# Patient Record
Sex: MALE | Race: WHITE | NOT HISPANIC OR LATINO | Employment: STUDENT | URBAN - METROPOLITAN AREA
[De-identification: names, ages, dates, MRNs, and addresses within clinical notes are randomized per-mention and may not be internally consistent; named-entity substitution may affect disease eponyms.]

---

## 2017-10-10 ENCOUNTER — OFFICE VISIT (OUTPATIENT)
Dept: PSYCHIATRY | Facility: CLINIC | Age: 22
End: 2017-10-10
Payer: COMMERCIAL

## 2017-10-10 VITALS
BODY MASS INDEX: 23.1 KG/M2 | SYSTOLIC BLOOD PRESSURE: 118 MMHG | DIASTOLIC BLOOD PRESSURE: 75 MMHG | HEART RATE: 69 BPM | WEIGHT: 147.19 LBS | HEIGHT: 67 IN

## 2017-10-10 DIAGNOSIS — F31.81 BIPOLAR II DISORDER: Primary | ICD-10-CM

## 2017-10-10 PROCEDURE — 90792 PSYCH DIAG EVAL W/MED SRVCS: CPT | Mod: S$GLB,,, | Performed by: PSYCHIATRY & NEUROLOGY

## 2017-10-10 PROCEDURE — 99999 PR PBB SHADOW E&M-EST. PATIENT-LVL II: CPT | Mod: PBBFAC,,, | Performed by: PSYCHIATRY & NEUROLOGY
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
## 2017-10-14 PROBLEM — F31.81 BIPOLAR II DISORDER: Status: ACTIVE | Noted: 2017-10-14

## 2017-10-19 ENCOUNTER — OFFICE VISIT (OUTPATIENT)
Dept: PSYCHIATRY | Facility: CLINIC | Age: 22
End: 2017-10-19
Payer: COMMERCIAL

## 2017-10-19 DIAGNOSIS — F31.81 BIPOLAR II DISORDER: Primary | ICD-10-CM

## 2017-10-19 PROCEDURE — 99999 PR PBB SHADOW E&M-EST. PATIENT-LVL II: CPT | Mod: PBBFAC,,, | Performed by: PSYCHIATRY & NEUROLOGY

## 2017-10-19 PROCEDURE — 99215 OFFICE O/P EST HI 40 MIN: CPT | Mod: S$GLB,,, | Performed by: PSYCHIATRY & NEUROLOGY

## 2017-10-19 RX ORDER — LITHIUM CARBONATE 300 MG
300 TABLET ORAL 2 TIMES DAILY
Qty: 60 TABLET | Refills: 2 | Status: SHIPPED | OUTPATIENT
Start: 2017-10-19 | End: 2018-01-28 | Stop reason: SDUPTHER

## 2017-10-19 NOTE — PATIENT INSTRUCTIONS
1. Start lithium 300 mg twice a day.  2. Return for follow up on Wednesday 10/25/17 at 9am.   3. Do not take your morning dose on Wednesday until after you get your blood drawn.  4. Get bloodwork on Wednesday 10/25 at 8:30 prior to your appointment (in lab area on 2nd floor of [facility])
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
## 2017-10-25 ENCOUNTER — OFFICE VISIT (OUTPATIENT)
Dept: PSYCHIATRY | Facility: CLINIC | Age: 22
End: 2017-10-25
Payer: COMMERCIAL

## 2017-10-25 VITALS
BODY MASS INDEX: 23.45 KG/M2 | SYSTOLIC BLOOD PRESSURE: 106 MMHG | WEIGHT: 149.38 LBS | HEIGHT: 67 IN | DIASTOLIC BLOOD PRESSURE: 65 MMHG | HEART RATE: 72 BPM

## 2017-10-25 DIAGNOSIS — F31.81 BIPOLAR II DISORDER: Primary | ICD-10-CM

## 2017-10-25 PROCEDURE — 99999 PR PBB SHADOW E&M-EST. PATIENT-LVL II: CPT | Mod: PBBFAC,,, | Performed by: PSYCHIATRY & NEUROLOGY

## 2017-10-25 PROCEDURE — 99215 OFFICE O/P EST HI 40 MIN: CPT | Mod: S$GLB,,, | Performed by: PSYCHIATRY & NEUROLOGY

## 2017-10-25 NOTE — PATIENT INSTRUCTIONS
1. Take 300 mg at bedtime tonight.  2. Do not take any medication tomorrow morning.  3. Get bloodwork tomorrow morning at 8 or 9am.  4. Starting tomorrow night, take 600 mg or 2 capsules every night at bedtime and nothing in the morning.
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
## 2017-11-06 ENCOUNTER — OFFICE VISIT (OUTPATIENT)
Dept: PSYCHIATRY | Facility: CLINIC | Age: 22
End: 2017-11-06
Payer: COMMERCIAL

## 2017-11-06 VITALS
WEIGHT: 149.19 LBS | SYSTOLIC BLOOD PRESSURE: 105 MMHG | DIASTOLIC BLOOD PRESSURE: 58 MMHG | HEIGHT: 67 IN | BODY MASS INDEX: 23.42 KG/M2 | HEART RATE: 80 BPM

## 2017-11-06 DIAGNOSIS — F31.81 BIPOLAR II DISORDER: Primary | ICD-10-CM

## 2017-11-06 PROCEDURE — 99999 PR PBB SHADOW E&M-EST. PATIENT-LVL III: CPT | Mod: PBBFAC,,, | Performed by: PSYCHIATRY & NEUROLOGY

## 2017-11-06 PROCEDURE — 99215 OFFICE O/P EST HI 40 MIN: CPT | Mod: S$GLB,,, | Performed by: PSYCHIATRY & NEUROLOGY
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
## 2017-11-06 NOTE — PATIENT INSTRUCTIONS
1. Continue lithium 600 mg at bedtime.  2. Get bloodwork today, set up with [facility] in front office.   3. Go to elv[facility] Koch.  4. If feeling worse, call or message through patient portal.  5. Set up patient portal.   6. Return on Monday 11/20 at 8am.

## 2018-01-28 DIAGNOSIS — F31.81 BIPOLAR II DISORDER: ICD-10-CM

## 2018-01-29 RX ORDER — LITHIUM CARBONATE 300 MG
TABLET ORAL
Qty: 60 TABLET | Refills: 0 | Status: SHIPPED | OUTPATIENT
Start: 2018-01-29 | End: 2018-10-06

## 2018-02-01 DIAGNOSIS — F31.81 BIPOLAR II DISORDER: ICD-10-CM

## 2018-02-01 RX ORDER — LITHIUM CARBONATE 300 MG
TABLET ORAL
Qty: 60 TABLET | Refills: 0 | OUTPATIENT
Start: 2018-02-01

## 2018-02-19 DIAGNOSIS — F31.81 BIPOLAR II DISORDER: ICD-10-CM

## 2018-02-20 RX ORDER — LITHIUM CARBONATE 300 MG
TABLET ORAL
Qty: 60 TABLET | Refills: 0 | OUTPATIENT
Start: 2018-02-20

## 2018-03-26 DIAGNOSIS — F31.81 BIPOLAR II DISORDER: ICD-10-CM

## 2018-03-27 RX ORDER — LITHIUM CARBONATE 300 MG
TABLET ORAL
Qty: 60 TABLET | Refills: 0 | OUTPATIENT
Start: 2018-03-27

## 2018-09-14 DIAGNOSIS — F31.81 BIPOLAR II DISORDER: ICD-10-CM

## 2018-09-16 DIAGNOSIS — F31.81 BIPOLAR II DISORDER: ICD-10-CM

## 2018-09-16 RX ORDER — LITHIUM CARBONATE 300 MG
TABLET ORAL
Qty: 60 TABLET | Refills: 0 | OUTPATIENT
Start: 2018-09-16

## 2018-09-17 ENCOUNTER — PATIENT MESSAGE (OUTPATIENT)
Dept: PSYCHIATRY | Facility: CLINIC | Age: 23
End: 2018-09-17

## 2018-09-27 ENCOUNTER — OFFICE VISIT (OUTPATIENT)
Dept: PSYCHIATRY | Facility: CLINIC | Age: 23
End: 2018-09-27
Payer: COMMERCIAL

## 2018-09-27 VITALS
SYSTOLIC BLOOD PRESSURE: 117 MMHG | HEART RATE: 59 BPM | HEIGHT: 67 IN | BODY MASS INDEX: 24.88 KG/M2 | WEIGHT: 158.5 LBS | DIASTOLIC BLOOD PRESSURE: 70 MMHG

## 2018-09-27 DIAGNOSIS — F31.81 BIPOLAR II DISORDER: Primary | ICD-10-CM

## 2018-09-27 PROCEDURE — 99999 PR PBB SHADOW E&M-EST. PATIENT-LVL III: CPT | Mod: PBBFAC,,, | Performed by: PSYCHIATRY & NEUROLOGY

## 2018-09-27 PROCEDURE — 99214 OFFICE O/P EST MOD 30 MIN: CPT | Mod: S$GLB,,, | Performed by: PSYCHIATRY & NEUROLOGY

## 2018-09-27 RX ORDER — ARIPIPRAZOLE 5 MG/1
5 TABLET ORAL DAILY
Qty: 30 TABLET | Refills: 3 | Status: SHIPPED | OUTPATIENT
Start: 2018-09-27 | End: 2018-11-01
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
## 2018-11-01 ENCOUNTER — OFFICE VISIT (OUTPATIENT)
Dept: PSYCHIATRY | Facility: CLINIC | Age: 23
End: 2018-11-01
Payer: COMMERCIAL

## 2018-11-01 VITALS
DIASTOLIC BLOOD PRESSURE: 73 MMHG | SYSTOLIC BLOOD PRESSURE: 113 MMHG | HEART RATE: 65 BPM | BODY MASS INDEX: 24.71 KG/M2 | HEIGHT: 67 IN | WEIGHT: 157.44 LBS

## 2018-11-01 DIAGNOSIS — F31.81 BIPOLAR II DISORDER: Primary | ICD-10-CM

## 2018-11-01 PROCEDURE — 99999 PR PBB SHADOW E&M-EST. PATIENT-LVL III: CPT | Mod: PBBFAC,,, | Performed by: PSYCHIATRY & NEUROLOGY

## 2018-11-01 PROCEDURE — 99214 OFFICE O/P EST MOD 30 MIN: CPT | Mod: S$GLB,,, | Performed by: PSYCHIATRY & NEUROLOGY

## 2018-11-01 RX ORDER — LITHIUM CARBONATE 300 MG/1
300 TABLET, FILM COATED, EXTENDED RELEASE ORAL 2 TIMES DAILY
Qty: 60 TABLET | Refills: 0 | Status: SHIPPED | OUTPATIENT
Start: 2018-11-01 | End: 2018-12-19
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
## 2018-11-01 NOTE — PATIENT INSTRUCTIONS
1. Set up therapy appointment and set up appointment with cardiology.  2. Stop abilify  3. Start lithium 300 mg twice a day. After you get bloodwork, you can move the entire amount to bedtime.   4. Get bloodwork one week after starting lithium. Get in done in the morning before you take your morning dose of lithium. You do not need to fast.  5. Set up appointment in two months, set up appointment for lithium level and other bloodwork for one one week from now in the morning.

## 2018-11-09 ENCOUNTER — LAB VISIT (OUTPATIENT)
Dept: LAB | Facility: HOSPITAL | Age: 23
End: 2018-11-09
Attending: PSYCHIATRY & NEUROLOGY
Payer: COMMERCIAL

## 2018-11-09 DIAGNOSIS — F31.81 BIPOLAR II DISORDER: ICD-10-CM

## 2018-11-09 LAB
ALBUMIN SERPL BCP-MCNC: 3.8 G/DL
ALP SERPL-CCNC: 68 U/L
ALT SERPL W/O P-5'-P-CCNC: 16 U/L
ANION GAP SERPL CALC-SCNC: 6 MMOL/L
AST SERPL-CCNC: 20 U/L
BILIRUB SERPL-MCNC: 0.6 MG/DL
BUN SERPL-MCNC: 18 MG/DL
CALCIUM SERPL-MCNC: 9.7 MG/DL
CHLORIDE SERPL-SCNC: 107 MMOL/L
CO2 SERPL-SCNC: 27 MMOL/L
CREAT SERPL-MCNC: 1.8 MG/DL
EST. GFR  (AFRICAN AMERICAN): 60 ML/MIN/1.73 M^2
EST. GFR  (NON AFRICAN AMERICAN): 51.9 ML/MIN/1.73 M^2
GLUCOSE SERPL-MCNC: 87 MG/DL
LITHIUM SERPL-SCNC: <0.1 MMOL/L
POTASSIUM SERPL-SCNC: 4.3 MMOL/L
PROT SERPL-MCNC: 6.3 G/DL
SODIUM SERPL-SCNC: 140 MMOL/L
TSH SERPL DL<=0.005 MIU/L-ACNC: 1.92 UIU/ML

## 2018-11-09 PROCEDURE — 80053 COMPREHEN METABOLIC PANEL: CPT

## 2018-11-09 PROCEDURE — 36415 COLL VENOUS BLD VENIPUNCTURE: CPT

## 2018-11-09 PROCEDURE — 84443 ASSAY THYROID STIM HORMONE: CPT

## 2018-11-09 PROCEDURE — 80178 ASSAY OF LITHIUM: CPT

## 2018-11-14 ENCOUNTER — PATIENT MESSAGE (OUTPATIENT)
Dept: PSYCHIATRY | Facility: CLINIC | Age: 23
End: 2018-11-14

## 2018-11-14 DIAGNOSIS — F31.81 BIPOLAR II DISORDER: Primary | ICD-10-CM

## 2018-11-15 RX ORDER — QUETIAPINE FUMARATE 100 MG/1
100 TABLET, FILM COATED ORAL NIGHTLY
Qty: 30 TABLET | Refills: 2 | Status: SHIPPED | OUTPATIENT
Start: 2018-11-15 | End: 2019-11-15

## 2018-11-15 NOTE — TELEPHONE ENCOUNTER
Called patient. He reports depressed mood for the past few days. Denies suicidal ideation but reports having very negative thoughts. Explained to patient that lithium increase would be contraindicated given his elevated creatinine and that we should use another medication. Informed him that we would be starting seroquel 100 mg qhs. Sent prescription to his pharmacy. If mood not improving within a few days, may increase to 200 mg daily. Instructed him to notify me if mood woresning, having problems with medication. Instructed him to go to ED if feeling significantly worse or if having suicidal ideation. Scheduling appt for 12/19, my next available. Instructed him to call to check for cancellations and to make appointment with primary care to discuss elevated creatinine.

## 2018-12-19 ENCOUNTER — OFFICE VISIT (OUTPATIENT)
Dept: PSYCHIATRY | Facility: CLINIC | Age: 23
End: 2018-12-19
Payer: COMMERCIAL

## 2018-12-19 VITALS
SYSTOLIC BLOOD PRESSURE: 112 MMHG | HEIGHT: 67 IN | BODY MASS INDEX: 23.67 KG/M2 | DIASTOLIC BLOOD PRESSURE: 63 MMHG | WEIGHT: 150.81 LBS | HEART RATE: 74 BPM

## 2018-12-19 DIAGNOSIS — R79.89 ELEVATED SERUM CREATININE: ICD-10-CM

## 2018-12-19 DIAGNOSIS — Q23.1 BICUSPID AORTIC VALVE: ICD-10-CM

## 2018-12-19 DIAGNOSIS — F31.81 BIPOLAR II DISORDER: Primary | ICD-10-CM

## 2018-12-19 PROCEDURE — 99999 PR PBB SHADOW E&M-EST. PATIENT-LVL III: CPT | Mod: PBBFAC,,, | Performed by: PSYCHIATRY & NEUROLOGY

## 2018-12-19 PROCEDURE — 90833 PSYTX W PT W E/M 30 MIN: CPT | Mod: S$GLB,,, | Performed by: PSYCHIATRY & NEUROLOGY

## 2018-12-19 PROCEDURE — 99214 OFFICE O/P EST MOD 30 MIN: CPT | Mod: S$GLB,,, | Performed by: PSYCHIATRY & NEUROLOGY
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
## 2018-12-19 NOTE — PATIENT INSTRUCTIONS
1. Continue seroquel 100 mg at bedtime, try taking earlier in the evening.  2. Make appointment with primary care [phone] or [facility] to schedule appointment to investigate cause of impaired kidney function.  3. Strongly recommend starting therapy.  4. Return in one month.

## 2024-04-06 NOTE — PATIENT INSTRUCTIONS
1. Call [phone] to set up follow up appointment with cardiology to follow up on aortic valve disease and aortic dilatation.  2. Also set up appointment with primary care doctor who can also monitor the above.  3. Start abilify 5 mg daily.  4. Return on Thursday 11/1/18 at 3pm.   No indicators present